# Patient Record
Sex: FEMALE | Employment: FULL TIME | URBAN - METROPOLITAN AREA
[De-identification: names, ages, dates, MRNs, and addresses within clinical notes are randomized per-mention and may not be internally consistent; named-entity substitution may affect disease eponyms.]

---

## 2017-06-01 PROBLEM — F41.9 ANXIETY: Status: ACTIVE | Noted: 2017-06-01

## 2017-12-15 ENCOUNTER — OFFICE VISIT (OUTPATIENT)
Dept: GASTROENTEROLOGY | Age: 32
End: 2017-12-15
Payer: COMMERCIAL

## 2017-12-15 VITALS
RESPIRATION RATE: 12 BRPM | SYSTOLIC BLOOD PRESSURE: 103 MMHG | HEIGHT: 68 IN | BODY MASS INDEX: 30.25 KG/M2 | HEART RATE: 108 BPM | OXYGEN SATURATION: 100 % | WEIGHT: 199.6 LBS | DIASTOLIC BLOOD PRESSURE: 72 MMHG

## 2017-12-15 DIAGNOSIS — R10.84 GENERALIZED ABDOMINAL PAIN: Primary | ICD-10-CM

## 2017-12-15 DIAGNOSIS — R10.30 LOWER ABDOMINAL PAIN: ICD-10-CM

## 2017-12-15 PROCEDURE — 99245 OFF/OP CONSLTJ NEW/EST HI 55: CPT | Performed by: INTERNAL MEDICINE

## 2017-12-15 ASSESSMENT — ENCOUNTER SYMPTOMS
CONSTIPATION: 0
NAUSEA: 0
ANAL BLEEDING: 0
ALLERGIC/IMMUNOLOGIC NEGATIVE: 1
TROUBLE SWALLOWING: 0
BLOOD IN STOOL: 0
ABDOMINAL PAIN: 1
RESPIRATORY NEGATIVE: 1
DIARRHEA: 0
RECTAL PAIN: 0
VOMITING: 0
ABDOMINAL DISTENTION: 0

## 2017-12-21 DIAGNOSIS — R10.84 GENERALIZED ABDOMINAL PAIN: Primary | ICD-10-CM

## 2017-12-21 DIAGNOSIS — R10.2 PELVIC PAIN IN FEMALE: ICD-10-CM

## 2017-12-21 LAB
ALBUMIN SERPL-MCNC: NORMAL G/DL
ALP BLD-CCNC: NORMAL U/L
ALT SERPL-CCNC: NORMAL U/L
ANION GAP SERPL CALCULATED.3IONS-SCNC: NORMAL MMOL/L
AST SERPL-CCNC: NORMAL U/L
BILIRUB SERPL-MCNC: NORMAL MG/DL (ref 0.1–1.4)
BILIRUBIN, URINE: NORMAL
BLOOD, URINE: NORMAL
BUN BLDV-MCNC: NORMAL MG/DL
CALCIUM SERPL-MCNC: NORMAL MG/DL
CHLORIDE BLD-SCNC: NORMAL MMOL/L
CLARITY: NORMAL
CO2: NORMAL MMOL/L
COLOR: NORMAL
CREAT SERPL-MCNC: NORMAL MG/DL
GFR CALCULATED: NORMAL
GLUCOSE BLD-MCNC: NORMAL MG/DL
GLUCOSE URINE: NORMAL
KETONES, URINE: NORMAL
LEUKOCYTE ESTERASE, URINE: NORMAL
LIPASE: NORMAL UNITS/L
NITRITE, URINE: NORMAL
PH UA: NORMAL (ref 4.5–8)
POTASSIUM SERPL-SCNC: NORMAL MMOL/L
PROTEIN UA: NORMAL
SODIUM BLD-SCNC: NORMAL MMOL/L
SPECIFIC GRAVITY, URINE: NORMAL
TOTAL PROTEIN: NORMAL
TSH SERPL DL<=0.05 MIU/L-ACNC: NORMAL UIU/ML
UROBILINOGEN, URINE: NORMAL

## 2017-12-26 DIAGNOSIS — R10.30 LOWER ABDOMINAL PAIN: ICD-10-CM

## 2017-12-28 ENCOUNTER — TELEPHONE (OUTPATIENT)
Dept: GASTROENTEROLOGY | Age: 32
End: 2017-12-28

## 2018-02-09 ENCOUNTER — OFFICE VISIT (OUTPATIENT)
Dept: GASTROENTEROLOGY | Age: 33
End: 2018-02-09
Payer: COMMERCIAL

## 2018-02-09 VITALS
WEIGHT: 199.2 LBS | SYSTOLIC BLOOD PRESSURE: 104 MMHG | HEART RATE: 72 BPM | HEIGHT: 69 IN | BODY MASS INDEX: 29.51 KG/M2 | DIASTOLIC BLOOD PRESSURE: 69 MMHG

## 2018-02-09 DIAGNOSIS — R10.11 RIGHT UPPER QUADRANT ABDOMINAL PAIN: ICD-10-CM

## 2018-02-09 DIAGNOSIS — K21.00 REFLUX ESOPHAGITIS: ICD-10-CM

## 2018-02-09 DIAGNOSIS — R10.84 GENERALIZED ABDOMINAL PAIN: ICD-10-CM

## 2018-02-09 DIAGNOSIS — R13.19 OTHER DYSPHAGIA: Primary | ICD-10-CM

## 2018-02-09 DIAGNOSIS — R10.31 ABDOMINAL PAIN, RIGHT LOWER QUADRANT: ICD-10-CM

## 2018-02-09 DIAGNOSIS — K52.9 ILEITIS: ICD-10-CM

## 2018-02-09 PROCEDURE — 99214 OFFICE O/P EST MOD 30 MIN: CPT | Performed by: INTERNAL MEDICINE

## 2018-02-09 ASSESSMENT — ENCOUNTER SYMPTOMS
NAUSEA: 0
ABDOMINAL DISTENTION: 0
COUGH: 0
DIARRHEA: 0
ANAL BLEEDING: 0
ABDOMINAL PAIN: 1
SINUS PAIN: 0
CHEST TIGHTNESS: 0
BACK PAIN: 0
WHEEZING: 0
TROUBLE SWALLOWING: 0
CONSTIPATION: 0
SINUS PRESSURE: 0
BLOOD IN STOOL: 0
VOMITING: 0
SHORTNESS OF BREATH: 0
RECTAL PAIN: 0
SORE THROAT: 0

## 2018-02-09 NOTE — PROGRESS NOTES
Subjective:      Patient ID: Stacey Patel is a 27 y.o. female. HPI   Dr. Maddie Richardson MD our mutual patient Stacey Patel was seen  for   1. Other dysphagia    2. Right upper quadrant abdominal pain    3. Reflux esophagitis    4. Generalized abdominal pain     . Patient seen along with her mother. Patient was seen by me few 2 months ago and at that time it was felt that patient may have IBS. She has significant family issues and has stressful lifestyle. It was felt this may be the etiology for her symptoms. She had workup done including labs/urine analysis CBC CMP lipase and TSH which appears to be within normal limits. She also had ultrasound of the pelvis to evaluate  discomfort/pain in the pelvic area. This exam reported to be unremarkable. Patient continued to have abdominal discomfort and nausea. She states that she has nocturnal symptoms not able to sleep. Her appetite has been decreased. She had cholecystectomy in the past.  Also on further discussion patient has pain in the right flank as well. It is not clear the pain is totally the right upper quadrant. The pain is not associated with micturition. No pelvic discharge. No fever or chills. Patient medications reviewed and she is on medication for anxiety depression. In fact Zoloft was increased to 100 mg a day during last visit and patient states that this is not helping her. However her swallowing is better. Still has GERD symptoms. Patient, and her mother are very concerned regarding patient's persistent symptoms. She is on omeprazole 20 mg a day. Other medications reviewed. Past Medical, Family, and Social History reviewed and does contribute to the patient presenting condition. patient\"s PMH/PSH,SH,PSYCH hx, MEDs, ALLERGIES, and ROS was all reviewed and updated ion the appropriate sections        Review of Systems   Constitutional: Negative for appetite change, fatigue and fever.    HENT: Negative for

## 2018-03-02 DIAGNOSIS — R10.31 ABDOMINAL PAIN, RIGHT LOWER QUADRANT: ICD-10-CM

## 2018-03-02 DIAGNOSIS — K52.9 ILEITIS: ICD-10-CM

## 2018-03-20 ENCOUNTER — TELEPHONE (OUTPATIENT)
Dept: GASTROENTEROLOGY | Age: 33
End: 2018-03-20

## 2018-12-04 ENCOUNTER — OFFICE VISIT (OUTPATIENT)
Dept: FAMILY MEDICINE CLINIC | Age: 33
End: 2018-12-04
Payer: COMMERCIAL

## 2018-12-04 VITALS
OXYGEN SATURATION: 98 % | WEIGHT: 195 LBS | HEIGHT: 69 IN | SYSTOLIC BLOOD PRESSURE: 105 MMHG | BODY MASS INDEX: 28.88 KG/M2 | DIASTOLIC BLOOD PRESSURE: 71 MMHG | HEART RATE: 66 BPM

## 2018-12-04 DIAGNOSIS — Z13.220 SCREENING, LIPID: ICD-10-CM

## 2018-12-04 DIAGNOSIS — Z90.49 HISTORY OF CHOLECYSTECTOMY: ICD-10-CM

## 2018-12-04 DIAGNOSIS — E55.9 VITAMIN D DEFICIENCY: ICD-10-CM

## 2018-12-04 DIAGNOSIS — R73.9 HYPERGLYCEMIA: ICD-10-CM

## 2018-12-04 DIAGNOSIS — F41.1 GAD (GENERALIZED ANXIETY DISORDER): Primary | ICD-10-CM

## 2018-12-04 DIAGNOSIS — Z13.29 SCREENING FOR THYROID DISORDER: ICD-10-CM

## 2018-12-04 DIAGNOSIS — Z11.4 ENCOUNTER FOR SCREENING FOR HIV: ICD-10-CM

## 2018-12-04 DIAGNOSIS — G43.001 MIGRAINE WITHOUT AURA AND WITH STATUS MIGRAINOSUS, NOT INTRACTABLE: ICD-10-CM

## 2018-12-04 DIAGNOSIS — E53.9 VITAMIN B DEFICIENCY: ICD-10-CM

## 2018-12-04 PROCEDURE — 99204 OFFICE O/P NEW MOD 45 MIN: CPT | Performed by: FAMILY MEDICINE

## 2018-12-04 RX ORDER — RIZATRIPTAN BENZOATE 10 MG/1
10 TABLET ORAL
COMMUNITY
End: 2018-12-04 | Stop reason: DRUGHIGH

## 2018-12-04 RX ORDER — RIZATRIPTAN BENZOATE 10 MG/1
10 TABLET ORAL
Qty: 27 TABLET | Refills: 1 | Status: SHIPPED | OUTPATIENT
Start: 2018-12-04 | End: 2019-01-07 | Stop reason: SDUPTHER

## 2018-12-04 RX ORDER — DIVALPROEX SODIUM 250 MG/1
250 TABLET, EXTENDED RELEASE ORAL DAILY
Qty: 30 TABLET | Refills: 3 | Status: SHIPPED | OUTPATIENT
Start: 2018-12-04 | End: 2019-03-06 | Stop reason: ALTCHOICE

## 2018-12-04 ASSESSMENT — PATIENT HEALTH QUESTIONNAIRE - PHQ9
2. FEELING DOWN, DEPRESSED OR HOPELESS: 0
SUM OF ALL RESPONSES TO PHQ9 QUESTIONS 1 & 2: 0
1. LITTLE INTEREST OR PLEASURE IN DOING THINGS: 0
SUM OF ALL RESPONSES TO PHQ QUESTIONS 1-9: 0
SUM OF ALL RESPONSES TO PHQ QUESTIONS 1-9: 0
1. LITTLE INTEREST OR PLEASURE IN DOING THINGS: 0
SUM OF ALL RESPONSES TO PHQ QUESTIONS 1-9: 0
SUM OF ALL RESPONSES TO PHQ QUESTIONS 1-9: 0
2. FEELING DOWN, DEPRESSED OR HOPELESS: 0
SUM OF ALL RESPONSES TO PHQ9 QUESTIONS 1 & 2: 0

## 2018-12-04 ASSESSMENT — ENCOUNTER SYMPTOMS
BLOOD IN STOOL: 0
TROUBLE SWALLOWING: 0
DIARRHEA: 0
VOICE CHANGE: 0
SHORTNESS OF BREATH: 0
RECTAL PAIN: 0
ABDOMINAL DISTENTION: 0
BACK PAIN: 0
ANAL BLEEDING: 0
EYE REDNESS: 0
EYE PAIN: 0
SINUS PRESSURE: 0
CONSTIPATION: 0
ABDOMINAL PAIN: 0
CHEST TIGHTNESS: 0
COUGH: 0
VOMITING: 0
COLOR CHANGE: 0
EYE DISCHARGE: 0
NAUSEA: 0

## 2018-12-08 LAB
ALBUMIN SERPL-MCNC: NORMAL G/DL
ALP BLD-CCNC: NORMAL U/L
ALT SERPL-CCNC: NORMAL U/L
ANION GAP SERPL CALCULATED.3IONS-SCNC: NORMAL MMOL/L
AST SERPL-CCNC: NORMAL U/L
AVERAGE GLUCOSE: 114
BASOPHILS ABSOLUTE: NORMAL /ΜL
BASOPHILS RELATIVE PERCENT: NORMAL %
BILIRUB SERPL-MCNC: NORMAL MG/DL (ref 0.1–1.4)
BUN BLDV-MCNC: NORMAL MG/DL
CALCIUM SERPL-MCNC: NORMAL MG/DL
CHLORIDE BLD-SCNC: NORMAL MMOL/L
CHOLESTEROL, TOTAL: 163 MG/DL
CHOLESTEROL/HDL RATIO: 4.1
CO2: NORMAL MMOL/L
CREAT SERPL-MCNC: 0.84 MG/DL
EOSINOPHILS ABSOLUTE: NORMAL /ΜL
EOSINOPHILS RELATIVE PERCENT: NORMAL %
GFR CALCULATED: NORMAL
GLUCOSE BLD-MCNC: NORMAL MG/DL
HBA1C MFR BLD: 5.6 %
HCT VFR BLD CALC: NORMAL % (ref 36–46)
HDLC SERPL-MCNC: 40 MG/DL (ref 35–70)
HEMOGLOBIN: NORMAL G/DL (ref 12–16)
HIV AG/AB: NORMAL
LDL CHOLESTEROL CALCULATED: 106 MG/DL (ref 0–160)
LYMPHOCYTES ABSOLUTE: NORMAL /ΜL
LYMPHOCYTES RELATIVE PERCENT: NORMAL %
MCH RBC QN AUTO: NORMAL PG
MCHC RBC AUTO-ENTMCNC: NORMAL G/DL
MCV RBC AUTO: NORMAL FL
MONOCYTES ABSOLUTE: NORMAL /ΜL
MONOCYTES RELATIVE PERCENT: NORMAL %
NEUTROPHILS ABSOLUTE: NORMAL /ΜL
NEUTROPHILS RELATIVE PERCENT: NORMAL %
PLATELET # BLD: NORMAL K/ΜL
PMV BLD AUTO: NORMAL FL
POTASSIUM SERPL-SCNC: 4.1 MMOL/L
RBC # BLD: NORMAL 10^6/ΜL
SODIUM BLD-SCNC: NORMAL MMOL/L
T3 FREE: NORMAL
T4 FREE: NORMAL
TOTAL PROTEIN: NORMAL
TRIGL SERPL-MCNC: 86 MG/DL
TSH SERPL DL<=0.05 MIU/L-ACNC: NORMAL UIU/ML
VITAMIN B-12: NORMAL
VITAMIN D 25-HYDROXY: NORMAL
VITAMIN D2, 25 HYDROXY: NORMAL
VITAMIN D3,25 HYDROXY: NORMAL
VLDLC SERPL CALC-MCNC: 17 MG/DL
WBC # BLD: NORMAL 10^3/ML

## 2018-12-13 DIAGNOSIS — Z11.4 ENCOUNTER FOR SCREENING FOR HIV: ICD-10-CM

## 2018-12-13 DIAGNOSIS — Z13.29 SCREENING FOR THYROID DISORDER: ICD-10-CM

## 2018-12-13 DIAGNOSIS — R73.9 HYPERGLYCEMIA: ICD-10-CM

## 2018-12-13 DIAGNOSIS — E55.9 VITAMIN D DEFICIENCY: ICD-10-CM

## 2018-12-13 DIAGNOSIS — Z13.220 SCREENING, LIPID: ICD-10-CM

## 2018-12-13 DIAGNOSIS — E53.9 VITAMIN B DEFICIENCY: ICD-10-CM

## 2019-01-07 ENCOUNTER — OFFICE VISIT (OUTPATIENT)
Dept: FAMILY MEDICINE CLINIC | Age: 34
End: 2019-01-07
Payer: COMMERCIAL

## 2019-01-07 VITALS
BODY MASS INDEX: 28.97 KG/M2 | HEIGHT: 69 IN | SYSTOLIC BLOOD PRESSURE: 98 MMHG | OXYGEN SATURATION: 97 % | WEIGHT: 195.6 LBS | DIASTOLIC BLOOD PRESSURE: 67 MMHG | HEART RATE: 67 BPM

## 2019-01-07 DIAGNOSIS — E53.8 B12 DEFICIENCY: ICD-10-CM

## 2019-01-07 DIAGNOSIS — F41.9 ANXIETY: ICD-10-CM

## 2019-01-07 DIAGNOSIS — G25.81 RLS (RESTLESS LEGS SYNDROME): ICD-10-CM

## 2019-01-07 DIAGNOSIS — E55.9 VITAMIN D DEFICIENCY: Primary | ICD-10-CM

## 2019-01-07 DIAGNOSIS — K21.00 REFLUX ESOPHAGITIS: ICD-10-CM

## 2019-01-07 DIAGNOSIS — G43.109 MIGRAINE WITH AURA AND WITHOUT STATUS MIGRAINOSUS, NOT INTRACTABLE: ICD-10-CM

## 2019-01-07 PROCEDURE — 99214 OFFICE O/P EST MOD 30 MIN: CPT | Performed by: FAMILY MEDICINE

## 2019-01-07 RX ORDER — RIZATRIPTAN BENZOATE 10 MG/1
10 TABLET ORAL
COMMUNITY
End: 2019-05-07 | Stop reason: SDUPTHER

## 2019-01-07 RX ORDER — MAGNESIUM 200 MG
1 TABLET ORAL DAILY
Qty: 90 TABLET | Refills: 5 | Status: SHIPPED | OUTPATIENT
Start: 2019-01-07 | End: 2020-01-29

## 2019-01-07 RX ORDER — ERGOCALCIFEROL 1.25 MG/1
50000 CAPSULE ORAL WEEKLY
Qty: 12 CAPSULE | Refills: 1 | Status: SHIPPED | OUTPATIENT
Start: 2019-01-07 | End: 2019-03-06 | Stop reason: ALTCHOICE

## 2019-01-07 RX ORDER — ROPINIROLE 0.25 MG/1
TABLET, FILM COATED ORAL
Qty: 90 TABLET | Refills: 3 | Status: SHIPPED | OUTPATIENT
Start: 2019-01-07 | End: 2020-01-14

## 2019-01-07 ASSESSMENT — ENCOUNTER SYMPTOMS
EYE DISCHARGE: 0
BACK PAIN: 0
ANAL BLEEDING: 0
VOICE CHANGE: 0
EYE PAIN: 0
DIARRHEA: 0
COUGH: 0
BLOOD IN STOOL: 0
COLOR CHANGE: 0
SHORTNESS OF BREATH: 0
SINUS PRESSURE: 0
VOMITING: 0
CHEST TIGHTNESS: 0
ABDOMINAL DISTENTION: 0
ABDOMINAL PAIN: 0
EYE REDNESS: 0
TROUBLE SWALLOWING: 0
NAUSEA: 0
CONSTIPATION: 0
RECTAL PAIN: 0

## 2019-01-07 ASSESSMENT — PATIENT HEALTH QUESTIONNAIRE - PHQ9
2. FEELING DOWN, DEPRESSED OR HOPELESS: 0
1. LITTLE INTEREST OR PLEASURE IN DOING THINGS: 0
SUM OF ALL RESPONSES TO PHQ QUESTIONS 1-9: 0
1. LITTLE INTEREST OR PLEASURE IN DOING THINGS: 0
SUM OF ALL RESPONSES TO PHQ QUESTIONS 1-9: 0
2. FEELING DOWN, DEPRESSED OR HOPELESS: 0
SUM OF ALL RESPONSES TO PHQ9 QUESTIONS 1 & 2: 0
SUM OF ALL RESPONSES TO PHQ9 QUESTIONS 1 & 2: 0

## 2019-02-05 ENCOUNTER — OFFICE VISIT (OUTPATIENT)
Dept: NEUROLOGY | Age: 34
End: 2019-02-05
Payer: COMMERCIAL

## 2019-02-05 VITALS
HEIGHT: 69 IN | BODY MASS INDEX: 28.14 KG/M2 | HEART RATE: 69 BPM | DIASTOLIC BLOOD PRESSURE: 72 MMHG | SYSTOLIC BLOOD PRESSURE: 104 MMHG | WEIGHT: 190 LBS

## 2019-02-05 DIAGNOSIS — G43.109 MIGRAINE WITH AURA AND WITHOUT STATUS MIGRAINOSUS, NOT INTRACTABLE: Primary | ICD-10-CM

## 2019-02-05 PROCEDURE — 99204 OFFICE O/P NEW MOD 45 MIN: CPT | Performed by: PSYCHIATRY & NEUROLOGY

## 2019-02-05 RX ORDER — TOPIRAMATE 25 MG/1
TABLET ORAL
Qty: 60 TABLET | Refills: 1 | Status: SHIPPED | OUTPATIENT
Start: 2019-02-05 | End: 2019-05-07 | Stop reason: ALTCHOICE

## 2019-02-19 DIAGNOSIS — G43.109 MIGRAINE WITH AURA AND WITHOUT STATUS MIGRAINOSUS, NOT INTRACTABLE: ICD-10-CM

## 2019-03-06 ENCOUNTER — OFFICE VISIT (OUTPATIENT)
Dept: NEUROLOGY | Age: 34
End: 2019-03-06
Payer: COMMERCIAL

## 2019-03-06 VITALS
WEIGHT: 191.8 LBS | HEIGHT: 69 IN | DIASTOLIC BLOOD PRESSURE: 74 MMHG | SYSTOLIC BLOOD PRESSURE: 110 MMHG | HEART RATE: 76 BPM | BODY MASS INDEX: 28.41 KG/M2

## 2019-03-06 DIAGNOSIS — G43.711 CHRONIC MIGRAINE WITHOUT AURA, WITH INTRACTABLE MIGRAINE, SO STATED, WITH STATUS MIGRAINOSUS: Primary | ICD-10-CM

## 2019-03-06 PROCEDURE — 99214 OFFICE O/P EST MOD 30 MIN: CPT | Performed by: PSYCHIATRY & NEUROLOGY

## 2019-05-07 ENCOUNTER — OFFICE VISIT (OUTPATIENT)
Dept: FAMILY MEDICINE CLINIC | Age: 34
End: 2019-05-07
Payer: COMMERCIAL

## 2019-05-07 VITALS
BODY MASS INDEX: 28.5 KG/M2 | SYSTOLIC BLOOD PRESSURE: 103 MMHG | OXYGEN SATURATION: 96 % | DIASTOLIC BLOOD PRESSURE: 70 MMHG | HEIGHT: 69 IN | HEART RATE: 80 BPM | WEIGHT: 192.4 LBS

## 2019-05-07 DIAGNOSIS — G43.809 OTHER MIGRAINE WITHOUT STATUS MIGRAINOSUS, NOT INTRACTABLE: Primary | ICD-10-CM

## 2019-05-07 DIAGNOSIS — F41.1 GAD (GENERALIZED ANXIETY DISORDER): ICD-10-CM

## 2019-05-07 DIAGNOSIS — J06.9 VIRAL URI: ICD-10-CM

## 2019-05-07 DIAGNOSIS — K21.00 REFLUX ESOPHAGITIS: ICD-10-CM

## 2019-05-07 DIAGNOSIS — F41.9 ANXIETY: ICD-10-CM

## 2019-05-07 PROCEDURE — 99213 OFFICE O/P EST LOW 20 MIN: CPT | Performed by: FAMILY MEDICINE

## 2019-05-07 RX ORDER — IBUPROFEN 600 MG/1
600 TABLET ORAL 3 TIMES DAILY PRN
Qty: 90 TABLET | Refills: 5 | Status: SHIPPED | OUTPATIENT
Start: 2019-05-07 | End: 2020-02-19 | Stop reason: SDUPTHER

## 2019-05-07 RX ORDER — RIZATRIPTAN BENZOATE 10 MG/1
10 TABLET ORAL
Qty: 9 TABLET | Refills: 0 | Status: SHIPPED | OUTPATIENT
Start: 2019-05-07 | End: 2020-02-19 | Stop reason: SDUPTHER

## 2019-05-07 ASSESSMENT — ENCOUNTER SYMPTOMS
COUGH: 0
BACK PAIN: 0
EYE PAIN: 0
SHORTNESS OF BREATH: 0
BLOOD IN STOOL: 0
NAUSEA: 0
ABDOMINAL PAIN: 0
TROUBLE SWALLOWING: 1
ABDOMINAL DISTENTION: 0
VOICE CHANGE: 0
ANAL BLEEDING: 0
RECTAL PAIN: 0
VOMITING: 0
DIARRHEA: 0
CONSTIPATION: 0
SORE THROAT: 1
EYE REDNESS: 0
COLOR CHANGE: 0
SINUS PRESSURE: 0
CHEST TIGHTNESS: 0
EYE DISCHARGE: 0

## 2019-05-07 NOTE — PROGRESS NOTES
Visit Information    Have you changed or started any medications since your last visit including any over-the-counter medicines, vitamins, or herbal medicines? no   Are you having any side effects from any of your medications? -  no  Have you stopped taking any of your medications? Is so, why? -  no    Have you seen any other physician or provider since your last visit? No  Have you had any other diagnostic tests since your last visit? Yes - Records Obtained  Have you been seen in the emergency room and/or had an admission to a hospital since we last saw you? No  Have you had your routine dental cleaning in the past 6 months? no    Have you activated your [employer] account? If not, what are your barriers?  Yes     Patient Care Team:  Kristine Del Real MD as PCP - General (Family Medicine)  Kierra Camejo MD as Consulting Physician (Gastroenterology)    Medical History Review  Past Medical, Family, and Social History reviewed and does contribute to the patient presenting condition    Health Maintenance   Topic Date Due    Varicella Vaccine (1 of 2 - 13+ 2-dose series) 03/07/1996    Cervical cancer screen  12/31/2019 (Originally 3/7/2004)    Flu vaccine (Season Ended) 12/31/2019 (Originally 9/1/2019)    DTaP/Tdap/Td vaccine (2 - Tdap) 10/12/2025    HIV screen  Completed    Pneumococcal 0-64 years Vaccine  Aged Out
Medication Sig Dispense Refill    rizatriptan (MAXALT) 10 MG tablet Take 1 tablet by mouth once as needed for Migraine May repeat in 2 hours if needed 9 tablet 0    ibuprofen (ADVIL;MOTRIN) 600 MG tablet Take 1 tablet by mouth 3 times daily as needed for Pain 90 tablet 5    sertraline (ZOLOFT) 50 MG tablet Take 1.5 tablets by mouth daily 45 tablet 5    Cyanocobalamin (B-12) 1000 MCG SUBL Place 1 tablet under the tongue daily 90 tablet 5    medroxyPROGESTERone (DEPO-PROVERA) 150 MG/ML injection Inject  into the muscle. Gets injection every 3 months      [DISCONTINUED] topiramate (TOPAMAX) 25 MG tablet 25 mg a day for 3 days then 25 mg BID 60 tablet 1    rOPINIRole (REQUIP) 0.25 MG tablet 1-2 at night as needed 90 tablet 3    [DISCONTINUED] rizatriptan (MAXALT) 10 MG tablet Take 10 mg by mouth once as needed for Migraine May repeat in 2 hours if needed      [DISCONTINUED] sertraline (ZOLOFT) 50 MG tablet Take 1.5 tabs daily to equal 75 mg a day (Patient taking differently: 75 mg Take 1.5 tabs daily to equal 75 mg a day) 135 tablet 1     No facility-administered encounter medications on file as of 5/7/2019.             Farrah Robison MD

## 2019-11-07 DIAGNOSIS — G43.001 MIGRAINE WITHOUT AURA AND WITH STATUS MIGRAINOSUS, NOT INTRACTABLE: ICD-10-CM

## 2019-11-08 RX ORDER — RIZATRIPTAN BENZOATE 10 MG/1
10 TABLET ORAL
Qty: 12 TABLET | Refills: 0 | Status: SHIPPED | OUTPATIENT
Start: 2019-11-08 | End: 2020-02-19 | Stop reason: SDUPTHER

## 2019-12-02 ENCOUNTER — OFFICE VISIT (OUTPATIENT)
Dept: FAMILY MEDICINE CLINIC | Age: 34
End: 2019-12-02
Payer: COMMERCIAL

## 2019-12-02 VITALS
WEIGHT: 196.6 LBS | DIASTOLIC BLOOD PRESSURE: 75 MMHG | SYSTOLIC BLOOD PRESSURE: 108 MMHG | OXYGEN SATURATION: 98 % | TEMPERATURE: 98.4 F | HEIGHT: 69 IN | BODY MASS INDEX: 29.12 KG/M2 | HEART RATE: 69 BPM

## 2019-12-02 DIAGNOSIS — J20.9 ACUTE BRONCHITIS, UNSPECIFIED ORGANISM: ICD-10-CM

## 2019-12-02 DIAGNOSIS — J06.9 VIRAL URI: Primary | ICD-10-CM

## 2019-12-02 PROCEDURE — 99213 OFFICE O/P EST LOW 20 MIN: CPT | Performed by: FAMILY MEDICINE

## 2019-12-02 RX ORDER — PREDNISONE 20 MG/1
TABLET ORAL
Qty: 10 TABLET | Refills: 0 | Status: SHIPPED | OUTPATIENT
Start: 2019-12-02 | End: 2020-02-19

## 2019-12-02 RX ORDER — RIZATRIPTAN BENZOATE 10 MG/1
TABLET ORAL
Refills: 0 | COMMUNITY
Start: 2019-11-08 | End: 2019-12-28

## 2019-12-02 ASSESSMENT — PATIENT HEALTH QUESTIONNAIRE - PHQ9
1. LITTLE INTEREST OR PLEASURE IN DOING THINGS: 0
2. FEELING DOWN, DEPRESSED OR HOPELESS: 0
SUM OF ALL RESPONSES TO PHQ QUESTIONS 1-9: 0
SUM OF ALL RESPONSES TO PHQ QUESTIONS 1-9: 0
SUM OF ALL RESPONSES TO PHQ9 QUESTIONS 1 & 2: 0

## 2019-12-02 ASSESSMENT — ENCOUNTER SYMPTOMS
EYE PAIN: 0
COLOR CHANGE: 0
SORE THROAT: 1
EYE DISCHARGE: 0
CHEST TIGHTNESS: 1
RECTAL PAIN: 0
RHINORRHEA: 1
SINUS PRESSURE: 0
EYE REDNESS: 0
ANAL BLEEDING: 0
VOICE CHANGE: 1
ABDOMINAL DISTENTION: 0
SHORTNESS OF BREATH: 0
BLOOD IN STOOL: 0
NAUSEA: 0
VOMITING: 0
DIARRHEA: 0
TROUBLE SWALLOWING: 0
ABDOMINAL PAIN: 0
CONSTIPATION: 0
COUGH: 1
BACK PAIN: 0

## 2019-12-28 RX ORDER — RIZATRIPTAN BENZOATE 10 MG/1
TABLET ORAL
Qty: 12 TABLET | Refills: 0 | Status: SHIPPED | OUTPATIENT
Start: 2019-12-28 | End: 2020-01-22

## 2020-01-14 RX ORDER — ROPINIROLE 0.25 MG/1
TABLET, FILM COATED ORAL
Qty: 60 TABLET | Refills: 5 | Status: SHIPPED | OUTPATIENT
Start: 2020-01-14 | End: 2021-01-29 | Stop reason: ALTCHOICE

## 2020-01-14 NOTE — TELEPHONE ENCOUNTER
[facility] is calling to request a refill on the following medication(s):  Requested Prescriptions     Pending Prescriptions Disp Refills    rOPINIRole (REQUIP) 0.25 MG tablet [Pharmacy Med Name: ROPINIROLE HCL 0.25 MG TABLET] 60 tablet 5     Sig: TAKE 1 OR 2 TABLETS BY MOUTH IN THE EVENING AS NEEDED       Last Visit Date (If Applicable):  98/1/7955    Next Visit Date:    Visit date not found    Electronically signed by Young Young MA on 1/14/20 at 12:39 PM

## 2020-01-22 RX ORDER — RIZATRIPTAN BENZOATE 10 MG/1
TABLET ORAL
Qty: 12 TABLET | Refills: 0 | Status: SHIPPED | OUTPATIENT
Start: 2020-01-22 | End: 2020-02-19 | Stop reason: SDUPTHER

## 2020-01-22 NOTE — TELEPHONE ENCOUNTER
[facility] is calling to request a refill on the following medication(s):  Requested Prescriptions     Pending Prescriptions Disp Refills    rizatriptan (MAXALT) 10 MG tablet [Pharmacy Med Name: RIZATRIPTAN 10 MG TABLET] 12 tablet 0     Sig: TAKE 1 TABLET BY MOUTH ONCE AS NEEDED FOR MIGRAINE MAY REPEAT IN 2 HOURS IF NEEDED       Last Visit Date (If Applicable):  76/1/0813    Next Visit Date:    Visit date not found    Electronically signed by Radha Eduardo MA on 1/22/20 at 1:42 PM

## 2020-01-29 RX ORDER — CHOLECALCIFEROL (VITAMIN D3) 125 MCG
CAPSULE ORAL
Qty: 30 TABLET | Refills: 17 | Status: SHIPPED | OUTPATIENT
Start: 2020-01-29

## 2020-01-29 NOTE — TELEPHONE ENCOUNTER
[facility] is calling to request a refill on the following medication(s):  Requested Prescriptions     Pending Prescriptions Disp Refills    B-12, Methylcobalamin, 1000 MCG SUBL [Pharmacy Med Name: B-12 1,000 MCG QUICK DISSOLVE] 30 tablet 17     Sig: PLACE 1 TABLET UNDER THE TONGUE DAILY       Last Visit Date (If Applicable):  30/8/5611    Next Visit Date:    Visit date not found    Electronically signed by Young Young MA on 1/29/20 at 8:30 AM

## 2020-02-19 ENCOUNTER — OFFICE VISIT (OUTPATIENT)
Dept: FAMILY MEDICINE CLINIC | Age: 35
End: 2020-02-19
Payer: COMMERCIAL

## 2020-02-19 VITALS
HEIGHT: 69 IN | SYSTOLIC BLOOD PRESSURE: 107 MMHG | DIASTOLIC BLOOD PRESSURE: 66 MMHG | HEART RATE: 63 BPM | BODY MASS INDEX: 28.17 KG/M2 | WEIGHT: 190.2 LBS | OXYGEN SATURATION: 97 %

## 2020-02-19 PROBLEM — F41.1 GAD (GENERALIZED ANXIETY DISORDER): Status: ACTIVE | Noted: 2020-02-19

## 2020-02-19 PROBLEM — F39 MOOD DISORDER (HCC): Status: ACTIVE | Noted: 2020-02-19

## 2020-02-19 PROBLEM — E53.8 B12 DEFICIENCY: Status: ACTIVE | Noted: 2020-02-19

## 2020-02-19 PROBLEM — E66.3 OVERWEIGHT (BMI 25.0-29.9): Status: ACTIVE | Noted: 2020-02-19

## 2020-02-19 PROCEDURE — 99213 OFFICE O/P EST LOW 20 MIN: CPT | Performed by: FAMILY MEDICINE

## 2020-02-19 RX ORDER — RIZATRIPTAN BENZOATE 10 MG/1
10 TABLET ORAL
Qty: 9 TABLET | Refills: 1 | Status: SHIPPED | OUTPATIENT
Start: 2020-02-19 | End: 2020-05-08

## 2020-02-19 ASSESSMENT — ENCOUNTER SYMPTOMS
EYE PAIN: 0
EYE DISCHARGE: 0
RECTAL PAIN: 0
VOICE CHANGE: 0
SINUS PRESSURE: 0
CHEST TIGHTNESS: 0
COLOR CHANGE: 0
COUGH: 0
BACK PAIN: 0
SHORTNESS OF BREATH: 0
TROUBLE SWALLOWING: 0
DIARRHEA: 0
CONSTIPATION: 0
BLOOD IN STOOL: 0
VOMITING: 0
ANAL BLEEDING: 0
NAUSEA: 0
EYE REDNESS: 0
ABDOMINAL DISTENTION: 0
ABDOMINAL PAIN: 0

## 2020-02-19 ASSESSMENT — PATIENT HEALTH QUESTIONNAIRE - PHQ9
SUM OF ALL RESPONSES TO PHQ QUESTIONS 1-9: 0
SUM OF ALL RESPONSES TO PHQ9 QUESTIONS 1 & 2: 0
1. LITTLE INTEREST OR PLEASURE IN DOING THINGS: 0
2. FEELING DOWN, DEPRESSED OR HOPELESS: 0
1. LITTLE INTEREST OR PLEASURE IN DOING THINGS: 0
SUM OF ALL RESPONSES TO PHQ9 QUESTIONS 1 & 2: 0
SUM OF ALL RESPONSES TO PHQ QUESTIONS 1-9: 0
2. FEELING DOWN, DEPRESSED OR HOPELESS: 0

## 2020-02-19 NOTE — PROGRESS NOTES
Subjective:      Patient ID: Hennie Meckel is a 37 y.o. female. HPI Here for a recheck of her mood and anxiety disorder. States sx are well controlled with zoloft 75 mg   a day. States very rare anxiety episodes that she gets only when she goes to places like [facility], usually avoids this. Sleep ok as well. Does not want Flu shot. Has PAP scheduled in April with Gyn before her next depot. Working on diet and exercise to lose weight. GERD sx controlled with occasional tums  Review of Systems   Constitutional: Negative for activity change, appetite change and fatigue. HENT: Negative for dental problem, ear pain, hearing loss, postnasal drip, sinus pressure, sneezing, tinnitus, trouble swallowing and voice change. Eyes: Negative for pain, discharge, redness and visual disturbance. Respiratory: Negative for cough, chest tightness and shortness of breath. Cardiovascular: Negative for chest pain, palpitations and leg swelling. Gastrointestinal: Negative for abdominal distention, abdominal pain, anal bleeding, blood in stool, constipation, diarrhea, nausea, rectal pain and vomiting. Endocrine: Negative for cold intolerance, heat intolerance, polydipsia, polyphagia and polyuria. Genitourinary: Negative for decreased urine volume, difficulty urinating, dyspareunia, dysuria, enuresis, flank pain, frequency, genital sores, hematuria, menstrual problem, pelvic pain, urgency, vaginal bleeding and vaginal discharge. Musculoskeletal: Negative for arthralgias, back pain, gait problem, joint swelling, myalgias, neck pain and neck stiffness. Skin: Negative for color change, pallor and rash. Allergic/Immunologic: Negative for environmental allergies, food allergies and immunocompromised state. Neurological: Negative for dizziness, tremors, seizures, syncope, facial asymmetry, speech difficulty, weakness, light-headedness, numbness and headaches. Hematological: Negative for adenopathy.  Does not bruise/bleed easily. Psychiatric/Behavioral: Negative for agitation, behavioral problems, confusion, decreased concentration, sleep disturbance and suicidal ideas. The patient is not nervous/anxious. Objective:   Physical Exam  Constitutional:       General: She is not in acute distress. HENT:      Head: Normocephalic and atraumatic. Right Ear: External ear normal.      Left Ear: External ear normal.   Eyes:      Conjunctiva/sclera: Conjunctivae normal.      Pupils: Pupils are equal, round, and reactive to light. Neck:      Musculoskeletal: Normal range of motion. Thyroid: No thyromegaly. Trachea: No tracheal deviation. Cardiovascular:      Rate and Rhythm: Normal rate and regular rhythm. Heart sounds: No murmur. No friction rub. No gallop. Pulmonary:      Effort: No respiratory distress. Breath sounds: No stridor. No wheezing or rales. Chest:      Chest wall: No tenderness. Abdominal:      General: Bowel sounds are normal. There is no distension. Palpations: Abdomen is soft. Tenderness: There is no abdominal tenderness. There is no rebound. Musculoskeletal: Normal range of motion. Lymphadenopathy:      Cervical: No cervical adenopathy. Skin:     General: Skin is warm. Coloration: Skin is not pale. Findings: No erythema or rash. Neurological:      Mental Status: She is alert and oriented to person, place, and time. Cranial Nerves: No cranial nerve deficit. Motor: No abnormal muscle tone. Deep Tendon Reflexes: Reflexes normal.         Assessment:       Diagnosis Orders   1. Mood disorder (HCC)  sertraline (ZOLOFT) 50 MG tablet   2. ELVIS (generalized anxiety disorder)  sertraline (ZOLOFT) 50 MG tablet   3. Other migraine without status migrainosus, not intractable  rizatriptan (MAXALT) 10 MG tablet   4. Anxiety     5. B12 deficiency           Plan:      No orders of the defined types were placed in this encounter.       Outpatient Encounter Medications as of 2/19/2020   Medication Sig Dispense Refill    sertraline (ZOLOFT) 50 MG tablet Take 1.5 tablets by mouth daily 135 tablet 1    rizatriptan (MAXALT) 10 MG tablet Take 1 tablet by mouth once as needed for Migraine May repeat in 2 hours if needed 9 tablet 1    rOPINIRole (REQUIP) 0.25 MG tablet TAKE 1 OR 2 TABLETS BY MOUTH IN THE EVENING AS NEEDED 60 tablet 5    medroxyPROGESTERone (DEPO-PROVERA) 150 MG/ML injection Inject  into the muscle. Gets injection every 3 months      B-12, Methylcobalamin, 1000 MCG SUBL PLACE 1 TABLET UNDER THE TONGUE DAILY (Patient not taking: Reported on 2/19/2020) 30 tablet 17    [DISCONTINUED] rizatriptan (MAXALT) 10 MG tablet TAKE 1 TABLET BY MOUTH ONCE AS NEEDED FOR MIGRAINE MAY REPEAT IN 2 HOURS IF NEEDED 12 tablet 0    [DISCONTINUED] predniSONE (DELTASONE) 20 MG tablet Take 1 tablet 2 times daily with food, for 5 days 10 tablet 0    [DISCONTINUED] rizatriptan (MAXALT) 10 MG tablet TAKE 1 TABLET BY MOUTH ONCE AS NEEDED FOR MIGRAINE MAY REPEAT IN 2 HOURS IF NEEDED 12 tablet 0    [DISCONTINUED] rizatriptan (MAXALT) 10 MG tablet Take 1 tablet by mouth once as needed for Migraine May repeat in 2 hours if needed 9 tablet 0    [DISCONTINUED] ibuprofen (ADVIL;MOTRIN) 600 MG tablet Take 1 tablet by mouth 3 times daily as needed for Pain 90 tablet 5    [DISCONTINUED] sertraline (ZOLOFT) 50 MG tablet Take 1.5 tablets by mouth daily 45 tablet 5     No facility-administered encounter medications on file as of 2/19/2020.             Massiel Dueñas MD

## 2020-05-08 RX ORDER — RIZATRIPTAN BENZOATE 10 MG/1
10 TABLET ORAL
Qty: 9 TABLET | Refills: 0 | Status: SHIPPED | OUTPATIENT
Start: 2020-05-08 | End: 2021-01-29

## 2020-05-08 NOTE — TELEPHONE ENCOUNTER
[facility] is calling to request a refill on the following medication(s):    Last Visit Date (If Applicable):  5/47/9834    Next Visit Date:    Visit date not found  Last filled 02/19/2020  Medication Request:  Requested Prescriptions     Pending Prescriptions Disp Refills    rizatriptan (MAXALT) 10 MG tablet [Pharmacy Med Name: RIZATRIPTAN 10 MG TABLET] 9 tablet 0     Sig: TAKE 1 TABLET BY MOUTH ONCE AS NEEDED FOR MIGRAINE MAY REPEAT IN 2 HOURS IF NEEDED

## 2020-06-23 RX ORDER — RIZATRIPTAN BENZOATE 10 MG/1
10 TABLET ORAL
Qty: 9 TABLET | Refills: 3 | Status: SHIPPED | OUTPATIENT
Start: 2020-06-23 | End: 2020-10-22

## 2020-07-01 ENCOUNTER — TELEMEDICINE (OUTPATIENT)
Dept: FAMILY MEDICINE CLINIC | Age: 35
End: 2020-07-01
Payer: COMMERCIAL

## 2020-07-01 PROBLEM — G44.029 CHRONIC CLUSTER HEADACHE, NOT INTRACTABLE: Status: ACTIVE | Noted: 2020-07-01

## 2020-07-01 PROBLEM — J06.9 VIRAL URI: Status: RESOLVED | Noted: 2019-05-07 | Resolved: 2020-07-01

## 2020-07-01 PROBLEM — G25.81 RLS (RESTLESS LEGS SYNDROME): Status: ACTIVE | Noted: 2020-07-01

## 2020-07-01 PROCEDURE — 99214 OFFICE O/P EST MOD 30 MIN: CPT | Performed by: FAMILY MEDICINE

## 2020-07-01 ASSESSMENT — ENCOUNTER SYMPTOMS
ANAL BLEEDING: 0
COLOR CHANGE: 0
ABDOMINAL DISTENTION: 0
CHEST TIGHTNESS: 0
BACK PAIN: 0
VOMITING: 0
COUGH: 0
VOICE CHANGE: 0
DIARRHEA: 0
RECTAL PAIN: 0
SINUS PRESSURE: 0
TROUBLE SWALLOWING: 0
ABDOMINAL PAIN: 0
EYE DISCHARGE: 0
EYE REDNESS: 0
SHORTNESS OF BREATH: 0
BLOOD IN STOOL: 0
CONSTIPATION: 0
EYE PAIN: 0
NAUSEA: 0

## 2020-07-01 ASSESSMENT — PATIENT HEALTH QUESTIONNAIRE - PHQ9
SUM OF ALL RESPONSES TO PHQ QUESTIONS 1-9: 0
SUM OF ALL RESPONSES TO PHQ9 QUESTIONS 1 & 2: 0
1. LITTLE INTEREST OR PLEASURE IN DOING THINGS: 0
SUM OF ALL RESPONSES TO PHQ QUESTIONS 1-9: 0
2. FEELING DOWN, DEPRESSED OR HOPELESS: 0

## 2020-07-01 NOTE — PROGRESS NOTES
Subjective:      Patient ID: Salvador Vieira is a 38 y.o. female. VV with patient after obtaining consent, patient and physician at respective homes. HPI  Here for follow up of chronic migraines, ELVIS, Mood disorder as well as B12 def and GERD sx. States she has been doing high intensity walking last couple of months and has lost 20 lbs . States has been having increased freq of HA after weather started changing . States maxalt helps with these and wants refills. Sleep has been good and RLS not bothersome anymore. States overall doing a lot better except for her HA that worsens always with weather changes. Drinks more than half her body weight in water. States has been doing a high protein diet. Review of Systems   Constitutional: Negative for activity change, appetite change and fatigue. HENT: Negative for dental problem, ear pain, hearing loss, postnasal drip, sinus pressure, sneezing, tinnitus, trouble swallowing and voice change. Eyes: Negative for pain, discharge, redness and visual disturbance. Respiratory: Negative for cough, chest tightness and shortness of breath. Cardiovascular: Negative for chest pain, palpitations and leg swelling. Gastrointestinal: Negative for abdominal distention, abdominal pain, anal bleeding, blood in stool, constipation, diarrhea, nausea, rectal pain and vomiting. Endocrine: Negative for cold intolerance, heat intolerance, polydipsia, polyphagia and polyuria. Genitourinary: Negative for decreased urine volume, difficulty urinating, dyspareunia, dysuria, enuresis, flank pain, frequency, genital sores, hematuria, menstrual problem, pelvic pain, urgency, vaginal bleeding and vaginal discharge. Musculoskeletal: Negative for arthralgias, back pain, gait problem, joint swelling, myalgias, neck pain and neck stiffness. Skin: Negative for color change, pallor and rash.    Allergic/Immunologic: Negative for environmental allergies, food allergies and immunocompromised state. Neurological: Positive for headaches. Negative for dizziness, tremors, seizures, syncope, facial asymmetry, speech difficulty, weakness, light-headedness and numbness. Hematological: Negative for adenopathy. Does not bruise/bleed easily. Psychiatric/Behavioral: Negative for agitation, behavioral problems, confusion, decreased concentration, sleep disturbance and suicidal ideas. The patient is not nervous/anxious. Objective:   Physical Exam  Constitutional:       General: She is not in acute distress. Appearance: Normal appearance. She is not ill-appearing, toxic-appearing or diaphoretic. Pulmonary:      Effort: Pulmonary effort is normal.   Neurological:      General: No focal deficit present. Mental Status: She is alert and oriented to person, place, and time. Mental status is at baseline. Psychiatric:         Mood and Affect: Mood normal.         Behavior: Behavior normal.         Thought Content: Thought content normal.         Judgment: Judgment normal.         Assessment:       Diagnosis Orders   1. Anxiety     2. Reflux esophagitis     3. Mood disorder ([address]     4. ELVIS (generalized anxiety disorder)     5. B12 deficiency     6. Overweight (BMI 25.0-29.9)     7. Chronic cluster headache, not intractable     8. Migraine with aura and without status migrainosus, not intractable     9. RLS (restless legs syndrome)     10. Dyslipidemia  Comprehensive Metabolic Panel    Lipid Panel   11. Vitamin D deficiency  Vitamin D 25 Hydroxy   12. Vitamin B deficiency  CBC    Folate    Vitamin B12   13. Screening for thyroid disorder  T3, Free    T4, Free    TSH without Reflex   14.  Hyperglycemia  Hemoglobin A1C         Plan:      Orders Placed This Encounter   Procedures    CBC    Comprehensive Metabolic Panel    Folate    Hemoglobin A1C    Lipid Panel    T3, Free    T4, Free    TSH without Reflex    Vitamin B12    Vitamin D 25 Hydroxy       Outpatient Encounter Medications as of 7/1/2020   Medication Sig Dispense Refill    sertraline (ZOLOFT) 50 MG tablet Take 1.5 tablets by mouth daily 135 tablet 1    B-12, Methylcobalamin, 1000 MCG SUBL PLACE 1 TABLET UNDER THE TONGUE DAILY 30 tablet 17    rOPINIRole (REQUIP) 0.25 MG tablet TAKE 1 OR 2 TABLETS BY MOUTH IN THE EVENING AS NEEDED 60 tablet 5    medroxyPROGESTERone (DEPO-PROVERA) 150 MG/ML injection Inject  into the muscle. Gets injection every 3 months      rizatriptan (MAXALT) 10 MG tablet Take 1 tablet by mouth once as needed for Migraine May repeat in 2 hours if needed 9 tablet 3    rizatriptan (MAXALT) 10 MG tablet TAKE 1 TABLET BY MOUTH ONCE AS NEEDED FOR MIGRAINE MAY REPEAT IN 2 HOURS IF NEEDED 9 tablet 0     No facility-administered encounter medications on file as of 7/1/2020.             Davin Connelly MD

## 2021-01-29 ENCOUNTER — OFFICE VISIT (OUTPATIENT)
Dept: FAMILY MEDICINE CLINIC | Age: 36
End: 2021-01-29
Payer: COMMERCIAL

## 2021-01-29 VITALS
BODY MASS INDEX: 24.97 KG/M2 | HEART RATE: 56 BPM | SYSTOLIC BLOOD PRESSURE: 102 MMHG | WEIGHT: 168.6 LBS | TEMPERATURE: 97.1 F | DIASTOLIC BLOOD PRESSURE: 68 MMHG | OXYGEN SATURATION: 99 % | HEIGHT: 69 IN

## 2021-01-29 DIAGNOSIS — R73.9 HYPERGLYCEMIA: ICD-10-CM

## 2021-01-29 DIAGNOSIS — F39 MOOD DISORDER (HCC): ICD-10-CM

## 2021-01-29 DIAGNOSIS — G43.109 MIGRAINE WITH AURA AND WITHOUT STATUS MIGRAINOSUS, NOT INTRACTABLE: ICD-10-CM

## 2021-01-29 DIAGNOSIS — Z11.59 NEED FOR HEPATITIS C SCREENING TEST: ICD-10-CM

## 2021-01-29 DIAGNOSIS — Z13.220 SCREENING, LIPID: ICD-10-CM

## 2021-01-29 DIAGNOSIS — F41.1 GAD (GENERALIZED ANXIETY DISORDER): ICD-10-CM

## 2021-01-29 DIAGNOSIS — Z13.29 SCREENING FOR THYROID DISORDER: Primary | ICD-10-CM

## 2021-01-29 DIAGNOSIS — Z78.9 USES BIRTH CONTROL: ICD-10-CM

## 2021-01-29 DIAGNOSIS — G44.029 CHRONIC CLUSTER HEADACHE, NOT INTRACTABLE: ICD-10-CM

## 2021-01-29 DIAGNOSIS — K21.00 GASTROESOPHAGEAL REFLUX DISEASE WITH ESOPHAGITIS, UNSPECIFIED WHETHER HEMORRHAGE: ICD-10-CM

## 2021-01-29 DIAGNOSIS — E53.8 B12 DEFICIENCY: ICD-10-CM

## 2021-01-29 DIAGNOSIS — E53.9 DEFICIENCY OF VITAMIN B: ICD-10-CM

## 2021-01-29 DIAGNOSIS — E55.9 VITAMIN D DEFICIENCY: ICD-10-CM

## 2021-01-29 PROBLEM — E66.3 OVERWEIGHT (BMI 25.0-29.9): Status: RESOLVED | Noted: 2020-02-19 | Resolved: 2021-01-29

## 2021-01-29 PROBLEM — G25.81 RLS (RESTLESS LEGS SYNDROME): Status: RESOLVED | Noted: 2020-07-01 | Resolved: 2021-01-29

## 2021-01-29 PROCEDURE — 99213 OFFICE O/P EST LOW 20 MIN: CPT | Performed by: FAMILY MEDICINE

## 2021-01-29 RX ORDER — RIZATRIPTAN BENZOATE 10 MG/1
10 TABLET ORAL
Qty: 9 TABLET | Refills: 5 | Status: SHIPPED | OUTPATIENT
Start: 2021-01-29 | End: 2021-10-13

## 2021-01-29 ASSESSMENT — PATIENT HEALTH QUESTIONNAIRE - PHQ9
SUM OF ALL RESPONSES TO PHQ QUESTIONS 1-9: 0
SUM OF ALL RESPONSES TO PHQ9 QUESTIONS 1 & 2: 0
SUM OF ALL RESPONSES TO PHQ QUESTIONS 1-9: 0
1. LITTLE INTEREST OR PLEASURE IN DOING THINGS: 0
2. FEELING DOWN, DEPRESSED OR HOPELESS: 0
SUM OF ALL RESPONSES TO PHQ QUESTIONS 1-9: 0
SUM OF ALL RESPONSES TO PHQ QUESTIONS 1-9: 0

## 2021-01-29 ASSESSMENT — ENCOUNTER SYMPTOMS
TROUBLE SWALLOWING: 0
ABDOMINAL DISTENTION: 0
BLOOD IN STOOL: 0
ANAL BLEEDING: 0
COUGH: 0
DIARRHEA: 0
RECTAL PAIN: 0
VOMITING: 0
EYE REDNESS: 0
BACK PAIN: 0
EYE PAIN: 0
SHORTNESS OF BREATH: 0
SINUS PRESSURE: 0
EYE DISCHARGE: 0
COLOR CHANGE: 0
ABDOMINAL PAIN: 0
CHEST TIGHTNESS: 0
NAUSEA: 0
VOICE CHANGE: 0
CONSTIPATION: 0

## 2021-01-29 NOTE — PROGRESS NOTES
Subjective:      Patient ID: Gustabo Lovell is a 38 y.o. female. HPI Here for a follow up of ELVIS, mood disorder as well as GERD , RLS as well as migraines. States RLS sx have resolved. Her GERD  s controlled with diet- if she plans on eating spicy foods she takes a tums or two as precaution. States mood, sleep, ELVIS controlled on low dose Zoloft. Has been exercising regularly and eating healthy and has lost weight since last visit. Has been   Review of Systems   Constitutional: Negative for activity change, appetite change and fatigue. HENT: Negative for dental problem, ear pain, hearing loss, postnasal drip, sinus pressure, sneezing, tinnitus, trouble swallowing and voice change. Eyes: Negative for pain, discharge, redness and visual disturbance. Respiratory: Negative for cough, chest tightness and shortness of breath. Cardiovascular: Negative for chest pain, palpitations and leg swelling. Gastrointestinal: Negative for abdominal distention, abdominal pain, anal bleeding, blood in stool, constipation, diarrhea, nausea, rectal pain and vomiting. Endocrine: Negative for cold intolerance, heat intolerance, polydipsia, polyphagia and polyuria. Genitourinary: Negative for decreased urine volume, difficulty urinating, dyspareunia, dysuria, enuresis, flank pain, frequency, genital sores, hematuria, menstrual problem, pelvic pain, urgency, vaginal bleeding and vaginal discharge. Musculoskeletal: Negative for arthralgias, back pain, gait problem, joint swelling, myalgias, neck pain and neck stiffness. Skin: Negative for color change, pallor and rash. Allergic/Immunologic: Negative for environmental allergies, food allergies and immunocompromised state. Neurological: Negative for dizziness, tremors, seizures, syncope, facial asymmetry, speech difficulty, weakness, light-headedness, numbness and headaches. Hematological: Negative for adenopathy. Does not bruise/bleed easily. Psychiatric/Behavioral: Negative for agitation, behavioral problems, confusion, decreased concentration, sleep disturbance and suicidal ideas. The patient is not nervous/anxious. Objective:   Physical Exam  Constitutional:       General: She is not in acute distress. HENT:      Head: Normocephalic and atraumatic. Right Ear: External ear normal.      Left Ear: External ear normal.   Eyes:      Conjunctiva/sclera: Conjunctivae normal.      Pupils: Pupils are equal, round, and reactive to light. Neck:      Musculoskeletal: Normal range of motion. Thyroid: No thyromegaly. Trachea: No tracheal deviation. Cardiovascular:      Rate and Rhythm: Normal rate and regular rhythm. Heart sounds: No murmur. No friction rub. No gallop. Pulmonary:      Effort: No respiratory distress. Breath sounds: No stridor. No wheezing or rales. Chest:      Chest wall: No tenderness. Abdominal:      General: Bowel sounds are normal. There is no distension. Palpations: Abdomen is soft. Tenderness: There is no abdominal tenderness. There is no rebound. Musculoskeletal: Normal range of motion. Lymphadenopathy:      Cervical: No cervical adenopathy. Skin:     General: Skin is warm. Coloration: Skin is not pale. Findings: No erythema or rash. Neurological:      General: No focal deficit present. Mental Status: She is alert and oriented to person, place, and time. Mental status is at baseline. Cranial Nerves: No cranial nerve deficit. Motor: No abnormal muscle tone. Deep Tendon Reflexes: Reflexes normal.   Psychiatric:         Mood and Affect: Mood normal.         Behavior: Behavior normal.         Thought Content: Thought content normal.         Judgment: Judgment normal.         Assessment:       Diagnosis Orders   1. Screening for thyroid disorder  T4, Free    TSH without Reflex   2. Screening, lipid  Comprehensive Metabolic Panel    Lipid Panel   3. Hyperglycemia  Hemoglobin A1C   4. Vitamin D deficiency  Vitamin D 25 Hydroxy   5. Deficiency of vitamin B  CBC    Folate    Vitamin B12   6. Need for hepatitis C screening test  Hepatitis C Antibody   7. Mood disorder (HCC)  sertraline (ZOLOFT) 50 MG tablet   8. ELVIS (generalized anxiety disorder)  sertraline (ZOLOFT) 50 MG tablet   9. B12 deficiency     10. Chronic cluster headache, not intractable     11. Migraine with aura and without status migrainosus, not intractable  rizatriptan (MAXALT) 10 MG tablet   12. Gastroesophageal reflux disease with esophagitis, unspecified whether hemorrhage     13. Uses birth control           Plan:      Orders Placed This Encounter   Procedures    CBC    Comprehensive Metabolic Panel    Folate    Hemoglobin A1C    Hepatitis C Antibody    Lipid Panel    T4, Free    TSH without Reflex    Vitamin B12    Vitamin D 25 Hydroxy       Outpatient Encounter Medications as of 1/29/2021   Medication Sig Dispense Refill    rizatriptan (MAXALT) 10 MG tablet Take 1 tablet by mouth once as needed for Migraine May repeat in 2 hours if needed 9 tablet 5    sertraline (ZOLOFT) 50 MG tablet Take 1 tablet by mouth daily 90 tablet 1    B-12, Methylcobalamin, 1000 MCG SUBL PLACE 1 TABLET UNDER THE TONGUE DAILY 30 tablet 17    medroxyPROGESTERone (DEPO-PROVERA) 150 MG/ML injection Inject  into the muscle.  Gets injection every 3 months      [DISCONTINUED] sertraline (ZOLOFT) 50 MG tablet Take 1.5 tablets by mouth daily Need seen in office before any further refill 45 tablet 0    [DISCONTINUED] rizatriptan (MAXALT) 10 MG tablet Take 1 tablet by mouth once as needed for Migraine May repeat in 2 hours if needed 9 tablet 0    [DISCONTINUED] rizatriptan (MAXALT) 10 MG tablet TAKE 1 TABLET BY MOUTH ONCE AS NEEDED FOR MIGRAINE MAY REPEAT IN 2 HOURS IF NEEDED 9 tablet 0    [DISCONTINUED] rOPINIRole (REQUIP) 0.25 MG tablet TAKE 1 OR 2 TABLETS BY MOUTH IN THE EVENING AS NEEDED 60 tablet 5     No facility-administered encounter medications on file as of 1/29/2021.             Amarilys Encinas MD

## 2021-02-01 DIAGNOSIS — F39 MOOD DISORDER (HCC): ICD-10-CM

## 2021-02-01 DIAGNOSIS — F41.1 GAD (GENERALIZED ANXIETY DISORDER): ICD-10-CM

## 2021-02-02 ENCOUNTER — TELEPHONE (OUTPATIENT)
Dept: FAMILY MEDICINE CLINIC | Age: 36
End: 2021-02-02

## 2021-02-02 DIAGNOSIS — F39 MOOD DISORDER (HCC): Primary | ICD-10-CM

## 2021-04-26 LAB
ALBUMIN SERPL-MCNC: 4.4 G/DL
ALP BLD-CCNC: 53 U/L
ALT SERPL-CCNC: 19 U/L
ANION GAP SERPL CALCULATED.3IONS-SCNC: NORMAL MMOL/L
ANTIBODY: NORMAL
AST SERPL-CCNC: 18 U/L
AVERAGE GLUCOSE: 111
BASOPHILS ABSOLUTE: NORMAL
BASOPHILS RELATIVE PERCENT: NORMAL
BILIRUB SERPL-MCNC: 1.1 MG/DL (ref 0.1–1.4)
BUN BLDV-MCNC: 25 MG/DL
CALCIUM SERPL-MCNC: 9.3 MG/DL
CHLORIDE BLD-SCNC: 108 MMOL/L
CHOLESTEROL, TOTAL: 142 MG/DL
CHOLESTEROL/HDL RATIO: 3
CO2: 26 MMOL/L
CREAT SERPL-MCNC: 0.95 MG/DL
EOSINOPHILS ABSOLUTE: NORMAL
EOSINOPHILS RELATIVE PERCENT: NORMAL
GFR CALCULATED: NORMAL
GLUCOSE BLD-MCNC: 82 MG/DL
HBA1C MFR BLD: 5.5 %
HCT VFR BLD CALC: NORMAL %
HDLC SERPL-MCNC: 48 MG/DL (ref 35–70)
HEMOGLOBIN: NORMAL
LDL CHOLESTEROL CALCULATED: 78 MG/DL (ref 0–160)
LYMPHOCYTES ABSOLUTE: NORMAL
LYMPHOCYTES RELATIVE PERCENT: NORMAL
MCH RBC QN AUTO: NORMAL PG
MCHC RBC AUTO-ENTMCNC: NORMAL G/DL
MCV RBC AUTO: NORMAL FL
MONOCYTES ABSOLUTE: NORMAL
MONOCYTES RELATIVE PERCENT: NORMAL
NEUTROPHILS ABSOLUTE: NORMAL
NEUTROPHILS RELATIVE PERCENT: NORMAL
NONHDLC SERPL-MCNC: NORMAL MG/DL
PLATELET # BLD: NORMAL 10*3/UL
PMV BLD AUTO: NORMAL FL
POTASSIUM SERPL-SCNC: 3.9 MMOL/L
RBC # BLD: NORMAL 10*6/UL
SODIUM BLD-SCNC: 143 MMOL/L
T4 FREE: NORMAL
TOTAL PROTEIN: 6.9
TRIGL SERPL-MCNC: 78 MG/DL
TSH SERPL DL<=0.05 MIU/L-ACNC: 1.56 UIU/ML
VITAMIN B-12: NORMAL
VITAMIN D 25-HYDROXY: NORMAL
VITAMIN D2, 25 HYDROXY: NORMAL
VITAMIN D3,25 HYDROXY: NORMAL
VLDLC SERPL CALC-MCNC: 16 MG/DL
WBC # BLD: NORMAL 10*3/UL

## 2021-04-29 ENCOUNTER — OFFICE VISIT (OUTPATIENT)
Dept: FAMILY MEDICINE CLINIC | Age: 36
End: 2021-04-29
Payer: COMMERCIAL

## 2021-04-29 VITALS
DIASTOLIC BLOOD PRESSURE: 62 MMHG | TEMPERATURE: 97.2 F | SYSTOLIC BLOOD PRESSURE: 89 MMHG | OXYGEN SATURATION: 99 % | HEIGHT: 69 IN | BODY MASS INDEX: 24.41 KG/M2 | WEIGHT: 164.8 LBS | HEART RATE: 62 BPM

## 2021-04-29 DIAGNOSIS — G44.029 CHRONIC CLUSTER HEADACHE, NOT INTRACTABLE: ICD-10-CM

## 2021-04-29 DIAGNOSIS — F39 MOOD DISORDER (HCC): ICD-10-CM

## 2021-04-29 DIAGNOSIS — R73.9 HYPERGLYCEMIA: ICD-10-CM

## 2021-04-29 DIAGNOSIS — F41.1 GAD (GENERALIZED ANXIETY DISORDER): ICD-10-CM

## 2021-04-29 DIAGNOSIS — U07.1 COVID-19 VIRUS INFECTION: ICD-10-CM

## 2021-04-29 DIAGNOSIS — K21.00 GASTROESOPHAGEAL REFLUX DISEASE WITH ESOPHAGITIS WITHOUT HEMORRHAGE: ICD-10-CM

## 2021-04-29 DIAGNOSIS — Z13.220 SCREENING, LIPID: ICD-10-CM

## 2021-04-29 DIAGNOSIS — E53.9 DEFICIENCY OF VITAMIN B: ICD-10-CM

## 2021-04-29 DIAGNOSIS — E53.8 B12 DEFICIENCY: ICD-10-CM

## 2021-04-29 DIAGNOSIS — Z11.59 NEED FOR HEPATITIS C SCREENING TEST: ICD-10-CM

## 2021-04-29 DIAGNOSIS — E55.9 VITAMIN D DEFICIENCY: Primary | ICD-10-CM

## 2021-04-29 DIAGNOSIS — Z13.29 SCREENING FOR THYROID DISORDER: ICD-10-CM

## 2021-04-29 DIAGNOSIS — E55.9 VITAMIN D DEFICIENCY: ICD-10-CM

## 2021-04-29 DIAGNOSIS — Z78.9 USES BIRTH CONTROL: ICD-10-CM

## 2021-04-29 PROCEDURE — 99213 OFFICE O/P EST LOW 20 MIN: CPT | Performed by: FAMILY MEDICINE

## 2021-04-29 RX ORDER — BUSPIRONE HYDROCHLORIDE 5 MG/1
5 TABLET ORAL 3 TIMES DAILY
Qty: 30 TABLET | Refills: 0 | Status: SHIPPED | OUTPATIENT
Start: 2021-04-29 | End: 2021-05-29

## 2021-04-29 ASSESSMENT — ENCOUNTER SYMPTOMS
VOMITING: 0
SORE THROAT: 1
SINUS PRESSURE: 0
CONSTIPATION: 0
VOICE CHANGE: 0
BLOOD IN STOOL: 0
COLOR CHANGE: 0
ABDOMINAL DISTENTION: 0
ANAL BLEEDING: 0
DIARRHEA: 0
RECTAL PAIN: 0
COUGH: 0
CHEST TIGHTNESS: 0
EYE DISCHARGE: 0
EYE REDNESS: 0
EYE PAIN: 0
NAUSEA: 0
SHORTNESS OF BREATH: 0
TROUBLE SWALLOWING: 0
BACK PAIN: 0
ABDOMINAL PAIN: 0

## 2021-04-29 ASSESSMENT — PATIENT HEALTH QUESTIONNAIRE - PHQ9
SUM OF ALL RESPONSES TO PHQ QUESTIONS 1-9: 0
SUM OF ALL RESPONSES TO PHQ QUESTIONS 1-9: 0

## 2021-04-29 NOTE — PROGRESS NOTES
Subjective:      Patient ID: Michael Colbert is a 43 y.o. female. HPI     States mood and anxiety stable on zoloft. But states twice had a Panic attacks both times when in   A restaurant. States otherwise she is ok. Will try short acting anxiety med and see if this will help. Sleep is ok. States GERD sx better recently, started apple cider vinegar, this seems to help, also   trying to avoid foods that trigger. States sleep is ok. Exercises regularly. Labs done and reviewed with patient. States has had an annoying sore throat and left eat pain x last 3 days. .  Covid test was neg yesterday and strep was also negative. No diff swallowing. [place] and loss of taste and sneezing - lasted for approx 2 weeks. Not yet had covid vaccine. Review of Systems   Constitutional: Negative for activity change, appetite change and fatigue. HENT: Positive for ear pain and sore throat. Negative for dental problem, hearing loss, postnasal drip, sinus pressure, sneezing, tinnitus, trouble swallowing and voice change. Eyes: Negative for pain, discharge, redness and visual disturbance. Respiratory: Negative for cough, chest tightness and shortness of breath. Cardiovascular: Negative for chest pain, palpitations and leg swelling. Gastrointestinal: Negative for abdominal distention, abdominal pain, anal bleeding, blood in stool, constipation, diarrhea, nausea, rectal pain and vomiting. Endocrine: Negative for cold intolerance, heat intolerance, polydipsia, polyphagia and polyuria. Genitourinary: Negative for decreased urine volume, difficulty urinating, dyspareunia, dysuria, enuresis, flank pain, frequency, genital sores, hematuria, menstrual problem, pelvic pain, urgency, vaginal bleeding and vaginal discharge. Musculoskeletal: Negative for arthralgias, back pain, gait problem, joint swelling, myalgias, neck pain and neck stiffness.    Skin: Negative for color change, pallor and rash.   Allergic/Immunologic: Negative for environmental allergies, food allergies and immunocompromised state. Neurological: Negative for dizziness, tremors, seizures, syncope, facial asymmetry, speech difficulty, weakness, light-headedness, numbness and headaches. Hematological: Negative for adenopathy. Does not bruise/bleed easily. Psychiatric/Behavioral: Negative for agitation, behavioral problems, confusion, decreased concentration, sleep disturbance and suicidal ideas. The patient is not nervous/anxious. Objective:   Physical Exam  Constitutional:       General: She is not in acute distress. HENT:      Head: Normocephalic and atraumatic. Right Ear: External ear normal.      Left Ear: External ear normal.   Eyes:      Conjunctiva/sclera: Conjunctivae normal.      Pupils: Pupils are equal, round, and reactive to light. Neck:      Musculoskeletal: Normal range of motion. Thyroid: No thyromegaly. Trachea: No tracheal deviation. Cardiovascular:      Rate and Rhythm: Normal rate and regular rhythm. Heart sounds: No murmur. No friction rub. No gallop. Pulmonary:      Effort: No respiratory distress. Breath sounds: No stridor. No wheezing or rales. Chest:      Chest wall: No tenderness. Abdominal:      General: Bowel sounds are normal. There is no distension. Palpations: Abdomen is soft. Tenderness: There is no abdominal tenderness. There is no rebound. Musculoskeletal: Normal range of motion. Lymphadenopathy:      Cervical: No cervical adenopathy. Skin:     General: Skin is warm. Coloration: Skin is not pale. Findings: No erythema or rash. Neurological:      Mental Status: She is alert and oriented to person, place, and time. Cranial Nerves: No cranial nerve deficit. Motor: No abnormal muscle tone. Deep Tendon Reflexes: Reflexes normal.         Assessment:       Diagnosis Orders   1. Vitamin D deficiency     2.  COVID-19 virus infection     3. Uses birth control     4. B12 deficiency     5. Chronic cluster headache, not intractable     6. ELVIS (generalized anxiety disorder)     7. Mood disorder ([facility] [address]     8. Gastroesophageal reflux disease with esophagitis without hemorrhage           Plan:      No orders of the defined types were placed in this encounter. Outpatient Encounter Medications as of 4/29/2021   Medication Sig Dispense Refill    sertraline (ZOLOFT) 50 MG tablet Take 1.5 tablets by mouth daily 135 tablet 0    B-12, Methylcobalamin, 1000 MCG SUBL PLACE 1 TABLET UNDER THE TONGUE DAILY 30 tablet 17    medroxyPROGESTERone (DEPO-PROVERA) 150 MG/ML injection Inject  into the muscle. Gets injection every 3 months      rizatriptan (MAXALT) 10 MG tablet Take 1 tablet by mouth once as needed for Migraine May repeat in 2 hours if needed 9 tablet 5     No facility-administered encounter medications on file as of 4/29/2021.             Rajani Severino MD

## 2021-05-16 DIAGNOSIS — F39 MOOD DISORDER (HCC): ICD-10-CM

## 2021-07-20 DIAGNOSIS — F39 MOOD DISORDER (HCC): ICD-10-CM

## 2021-09-14 DIAGNOSIS — F39 MOOD DISORDER (HCC): ICD-10-CM

## 2021-11-15 DIAGNOSIS — F39 MOOD DISORDER (HCC): ICD-10-CM

## 2021-11-15 NOTE — TELEPHONE ENCOUNTER
Mona Levi is calling to request a refill on the following medication(s):    Last Visit Date (If Applicable):  7/56/2177    Next Visit Date:    11/29/2021    Medication Request:  Requested Prescriptions     Pending Prescriptions Disp Refills    sertraline (ZOLOFT) 50 MG tablet 45 tablet 1

## 2021-12-02 ENCOUNTER — OFFICE VISIT (OUTPATIENT)
Dept: FAMILY MEDICINE CLINIC | Age: 36
End: 2021-12-02
Payer: COMMERCIAL

## 2021-12-02 VITALS
BODY MASS INDEX: 25.42 KG/M2 | OXYGEN SATURATION: 98 % | DIASTOLIC BLOOD PRESSURE: 67 MMHG | WEIGHT: 171.6 LBS | HEIGHT: 69 IN | HEART RATE: 84 BPM | SYSTOLIC BLOOD PRESSURE: 107 MMHG | TEMPERATURE: 97.7 F

## 2021-12-02 DIAGNOSIS — Z78.9 USES BIRTH CONTROL: ICD-10-CM

## 2021-12-02 DIAGNOSIS — F41.1 GAD (GENERALIZED ANXIETY DISORDER): ICD-10-CM

## 2021-12-02 DIAGNOSIS — G43.109 MIGRAINE WITH AURA AND WITHOUT STATUS MIGRAINOSUS, NOT INTRACTABLE: ICD-10-CM

## 2021-12-02 DIAGNOSIS — L84 CALLUS OF FOOT: ICD-10-CM

## 2021-12-02 DIAGNOSIS — F39 MOOD DISORDER (HCC): Primary | ICD-10-CM

## 2021-12-02 DIAGNOSIS — E53.8 B12 DEFICIENCY: ICD-10-CM

## 2021-12-02 PROBLEM — U07.1 COVID-19 VIRUS INFECTION: Status: RESOLVED | Noted: 2021-04-29 | Resolved: 2021-12-02

## 2021-12-02 PROBLEM — G44.029 CHRONIC CLUSTER HEADACHE, NOT INTRACTABLE: Status: RESOLVED | Noted: 2020-07-01 | Resolved: 2021-12-02

## 2021-12-02 PROCEDURE — 99213 OFFICE O/P EST LOW 20 MIN: CPT | Performed by: FAMILY MEDICINE

## 2021-12-02 RX ORDER — NITROFURANTOIN 25; 75 MG/1; MG/1
CAPSULE ORAL
COMMUNITY
Start: 2021-10-19 | End: 2021-12-02 | Stop reason: ALTCHOICE

## 2021-12-02 RX ORDER — COVID-19 MOLECULAR TEST ASSAY
KIT MISCELLANEOUS
COMMUNITY
Start: 2021-11-15

## 2021-12-02 RX ORDER — RIZATRIPTAN BENZOATE 10 MG/1
10 TABLET ORAL
Qty: 27 TABLET | Refills: 0 | Status: SHIPPED | OUTPATIENT
Start: 2021-12-02 | End: 2022-03-11

## 2021-12-02 ASSESSMENT — ENCOUNTER SYMPTOMS
COLOR CHANGE: 0
EYE REDNESS: 0
COUGH: 0
VOICE CHANGE: 0
BACK PAIN: 0
SINUS PRESSURE: 0
CHEST TIGHTNESS: 0
RECTAL PAIN: 0
ANAL BLEEDING: 0
VOMITING: 0
CONSTIPATION: 0
SHORTNESS OF BREATH: 0
TROUBLE SWALLOWING: 0
EYE DISCHARGE: 0
ABDOMINAL DISTENTION: 0
NAUSEA: 0
DIARRHEA: 0
ABDOMINAL PAIN: 0
EYE PAIN: 0
BLOOD IN STOOL: 0

## 2021-12-02 NOTE — PROGRESS NOTES
dizziness, tremors, seizures, syncope, facial asymmetry, speech difficulty, weakness, light-headedness, numbness and headaches. Hematological: Negative for adenopathy. Does not bruise/bleed easily. Psychiatric/Behavioral: Negative for agitation, behavioral problems, confusion, decreased concentration, sleep disturbance and suicidal ideas. The patient is not nervous/anxious. Objective:   Physical Exam  Constitutional:       General: She is not in acute distress. HENT:      Head: Normocephalic and atraumatic. Right Ear: External ear normal.      Left Ear: External ear normal.      Nose: Nose normal.      Mouth/Throat:      Mouth: Mucous membranes are moist.   Eyes:      Conjunctiva/sclera: Conjunctivae normal.      Pupils: Pupils are equal, round, and reactive to light. Neck:      Thyroid: No thyromegaly. Trachea: No tracheal deviation. Cardiovascular:      Rate and Rhythm: Normal rate and regular rhythm. Pulses: Normal pulses. Heart sounds: Normal heart sounds. No murmur heard. No friction rub. No gallop. Pulmonary:      Effort: Pulmonary effort is normal. No respiratory distress. Breath sounds: Normal breath sounds. No stridor. No wheezing or rales. Chest:      Chest wall: No tenderness. Abdominal:      General: Bowel sounds are normal. There is no distension. Palpations: Abdomen is soft. Tenderness: There is no abdominal tenderness. There is no rebound. Musculoskeletal:         General: Normal range of motion. Cervical back: Normal range of motion. Lymphadenopathy:      Cervical: No cervical adenopathy. Skin:     General: Skin is warm. Coloration: Skin is not pale. Findings: No erythema or rash. Neurological:      General: No focal deficit present. Mental Status: She is alert and oriented to person, place, and time. Mental status is at baseline. Cranial Nerves: No cranial nerve deficit. Motor: No abnormal muscle tone. Deep Tendon Reflexes: Reflexes normal.   Psychiatric:         Mood and Affect: Mood normal.         Behavior: Behavior normal.         Thought Content: Thought content normal.         Judgment: Judgment normal.          Vitals:    12/02/21 1606   BP: 107/67   Pulse: 84   Temp: 97.7 °F (36.5 °C)   SpO2: 98%         Assessment:      Diagnosis Orders   1. Mood disorder ([address]     2. ELVIS (generalized anxiety disorder)     3. B12 deficiency     4. Migraine with aura and without status migrainosus, not intractable     5. Uses birth control           Plan:     Orders Placed This Encounter   Procedures    [facility])       Outpatient Encounter Medications as of 12/2/2021   Medication Sig Dispense Refill    ID NOW COVID-19 KIT TEST AS DIRECTED TODAY      nitrofurantoin, macrocrystal-monohydrate, (MACROBID) 100 MG capsule       sertraline (ZOLOFT) 50 MG tablet TAKE 1 AND 1/2 TABLETS BY MOUTH EVERY DAY 45 tablet 0    B-12, Methylcobalamin, 1000 MCG SUBL PLACE 1 TABLET UNDER THE TONGUE DAILY 30 tablet 17    medroxyPROGESTERone (DEPO-PROVERA) 150 MG/ML injection Inject  into the muscle. Gets injection every 3 months      rizatriptan (MAXALT) 10 MG tablet TAKE 1 TABLET BY MOUTH ONCE AS NEEDED FOR MIGRAINE MAY REPEAT IN 2 HOURS IF NEEDED 9 tablet 0     No facility-administered encounter medications on file as of 12/2/2021.

## 2022-03-11 DIAGNOSIS — G43.109 MIGRAINE WITH AURA AND WITHOUT STATUS MIGRAINOSUS, NOT INTRACTABLE: ICD-10-CM

## 2022-03-11 RX ORDER — RIZATRIPTAN BENZOATE 10 MG/1
10 TABLET ORAL
Qty: 12 TABLET | Refills: 2 | Status: SHIPPED | OUTPATIENT
Start: 2022-03-11 | End: 2022-03-11

## 2022-06-14 DIAGNOSIS — F39 MOOD DISORDER (HCC): ICD-10-CM

## 2022-06-14 DIAGNOSIS — G43.109 MIGRAINE WITH AURA AND WITHOUT STATUS MIGRAINOSUS, NOT INTRACTABLE: ICD-10-CM

## 2022-06-14 DIAGNOSIS — F41.1 GAD (GENERALIZED ANXIETY DISORDER): ICD-10-CM

## 2022-06-15 RX ORDER — RIZATRIPTAN BENZOATE 10 MG/1
10 TABLET ORAL
Qty: 12 TABLET | Refills: 2 | OUTPATIENT
Start: 2022-06-15 | End: 2022-06-15

## 2022-06-22 ENCOUNTER — OFFICE VISIT (OUTPATIENT)
Dept: FAMILY MEDICINE CLINIC | Age: 37
End: 2022-06-22
Payer: COMMERCIAL

## 2022-06-22 VITALS
TEMPERATURE: 97.2 F | DIASTOLIC BLOOD PRESSURE: 62 MMHG | WEIGHT: 174.6 LBS | HEIGHT: 69 IN | HEART RATE: 69 BPM | SYSTOLIC BLOOD PRESSURE: 99 MMHG | OXYGEN SATURATION: 98 % | BODY MASS INDEX: 25.86 KG/M2

## 2022-06-22 DIAGNOSIS — G43.109 MIGRAINE WITH AURA AND WITHOUT STATUS MIGRAINOSUS, NOT INTRACTABLE: ICD-10-CM

## 2022-06-22 DIAGNOSIS — Z78.9 USES BIRTH CONTROL: ICD-10-CM

## 2022-06-22 DIAGNOSIS — E53.8 B12 DEFICIENCY: ICD-10-CM

## 2022-06-22 DIAGNOSIS — F39 MOOD DISORDER (HCC): Primary | ICD-10-CM

## 2022-06-22 DIAGNOSIS — E55.9 VITAMIN D DEFICIENCY: ICD-10-CM

## 2022-06-22 PROCEDURE — 99213 OFFICE O/P EST LOW 20 MIN: CPT | Performed by: FAMILY MEDICINE

## 2022-06-22 SDOH — ECONOMIC STABILITY: FOOD INSECURITY: WITHIN THE PAST 12 MONTHS, THE FOOD YOU BOUGHT JUST DIDN'T LAST AND YOU DIDN'T HAVE MONEY TO GET MORE.: NEVER TRUE

## 2022-06-22 SDOH — ECONOMIC STABILITY: FOOD INSECURITY: WITHIN THE PAST 12 MONTHS, YOU WORRIED THAT YOUR FOOD WOULD RUN OUT BEFORE YOU GOT MONEY TO BUY MORE.: NEVER TRUE

## 2022-06-22 ASSESSMENT — PATIENT HEALTH QUESTIONNAIRE - PHQ9
SUM OF ALL RESPONSES TO PHQ QUESTIONS 1-9: 0
SUM OF ALL RESPONSES TO PHQ QUESTIONS 1-9: 0
2. FEELING DOWN, DEPRESSED OR HOPELESS: 0
1. LITTLE INTEREST OR PLEASURE IN DOING THINGS: 0
SUM OF ALL RESPONSES TO PHQ QUESTIONS 1-9: 0
SUM OF ALL RESPONSES TO PHQ9 QUESTIONS 1 & 2: 0
SUM OF ALL RESPONSES TO PHQ QUESTIONS 1-9: 0

## 2022-06-22 ASSESSMENT — ENCOUNTER SYMPTOMS
TROUBLE SWALLOWING: 0
DIARRHEA: 0
CHEST TIGHTNESS: 0
BLOOD IN STOOL: 0
ABDOMINAL PAIN: 0
ABDOMINAL DISTENTION: 0
ANAL BLEEDING: 0
NAUSEA: 0
EYE DISCHARGE: 0
EYE PAIN: 0
VOMITING: 0
COLOR CHANGE: 0
BACK PAIN: 0
VOICE CHANGE: 0
CONSTIPATION: 0
EYE REDNESS: 0
SINUS PRESSURE: 0
RECTAL PAIN: 0
SHORTNESS OF BREATH: 0
COUGH: 0

## 2022-06-22 ASSESSMENT — SOCIAL DETERMINANTS OF HEALTH (SDOH): HOW HARD IS IT FOR YOU TO PAY FOR THE VERY BASICS LIKE FOOD, HOUSING, MEDICAL CARE, AND HEATING?: NOT HARD AT ALL

## 2022-06-22 NOTE — PROGRESS NOTES
Subjective:      Patient ID: Emily Toledo is a 42 y.o. female. Here for a recheck of anxiety as well as mood disorder and follow up of her chronic headaches. States needs med refills.  gets headaches when the weather changes- states starts as pressure in back of her head- sometimes moves to cheeks and to behind her eyes. Sometimes gets nauseated with these. Sometimes she can feel HA coming on - eyes cant focus, temples and shoulders start hurting. States during weather changes has a HA x 1-2 days. States if no weather changes she  Does not get one at all. States maxalt seems to stop the Headache. Usually 1 maxalt helps. And if still present takes a second one 6 hours later. States so far this year- has had a couple of HA on average a month. States had eye exam last last year or two. Wears contacts  She has seen a neurologist in the past.Dr carrillo- she did not respond to Depakote or Topamx. Aimovig and Botox was recommended but patient refused and has not seen them since 2019. States mood has been ok with zoloft- states 50 mg dose seems to help with her anxiety as well. State sometimes has to take buspar - rarely. 6-24-22  Patient called back and states she is taking 75 mg zoloft and not 50 mg daily as she told us during her visit. Review of Systems   Constitutional: Negative for activity change, appetite change and fatigue. HENT: Negative for dental problem, ear pain, hearing loss, postnasal drip, sinus pressure, sneezing, tinnitus, trouble swallowing and voice change. Eyes: Negative for pain, discharge, redness and visual disturbance. Respiratory: Negative for cough, chest tightness and shortness of breath. Cardiovascular: Negative for chest pain, palpitations and leg swelling. Gastrointestinal: Negative for abdominal distention, abdominal pain, anal bleeding, blood in stool, constipation, diarrhea, nausea, rectal pain and vomiting.    Endocrine: Negative for cold intolerance, heat Abdomen is soft. Tenderness: There is no abdominal tenderness. There is no rebound. Musculoskeletal:         General: Normal range of motion. Cervical back: Normal range of motion. Lymphadenopathy:      Cervical: No cervical adenopathy. Skin:     General: Skin is warm. Coloration: Skin is not pale. Findings: No erythema or rash. Neurological:      General: No focal deficit present. Mental Status: She is alert and oriented to person, place, and time. Mental status is at baseline. Cranial Nerves: No cranial nerve deficit. Motor: No abnormal muscle tone. Deep Tendon Reflexes: Reflexes normal.   Psychiatric:         Mood and Affect: Mood normal.         Behavior: Behavior normal.         Thought Content: Thought content normal.         Judgment: Judgment normal.          Vitals:    06/22/22 1512   BP: 99/62   Pulse: 69   Temp: 97.2 °F (36.2 °C)   SpO2: 98%         Assessment:      Diagnosis Orders   1. Mood disorder ([facility]     2. Migraine with aura and without status migrainosus, not intractable     3. Vitamin D deficiency     4. B12 deficiency     5. Uses birth control           Plan:     No orders of the defined types were placed in this encounter. Outpatient Encounter Medications as of 6/22/2022   Medication Sig Dispense Refill    sertraline (ZOLOFT) 50 MG tablet Take 1 tablet by mouth daily 90 tablet 1    ID NOW COVID-19 KIT TEST AS DIRECTED TODAY      B-12, Methylcobalamin, 1000 MCG SUBL PLACE 1 TABLET UNDER THE TONGUE DAILY 30 tablet 17    medroxyPROGESTERone (DEPO-PROVERA) 150 MG/ML injection Inject  into the muscle.  Gets injection every 3 months      rizatriptan (MAXALT) 10 MG tablet TAKE 1 TABLET BY MOUTH ONCE AS NEEDED FOR MIGRAINE MAY REPEAT IN 2 HOURS IF NEEDED 12 tablet 2    [DISCONTINUED] sertraline (ZOLOFT) 50 MG tablet Take 1.5 tablets by mouth daily 135 tablet 1     No facility-administered encounter medications on file as of 6/22/2022.     20 minutes spent with patient counseling/educating on acute/chronic medical health problems, medications,  along with treatment options. Reviewed multiple labs/imaging/medications with patient during this time. Following Diet discussion/education was done on healthy diet. In addition Exercise plan and depression screen were discussed. Recent labs/imaging were discussed and reviewed with patient.

## 2022-06-24 DIAGNOSIS — F39 MOOD DISORDER (HCC): ICD-10-CM

## 2022-06-24 DIAGNOSIS — F39 MOOD DISORDER (HCC): Primary | ICD-10-CM

## 2022-06-24 NOTE — TELEPHONE ENCOUNTER
During vidsit she never said she was taking 75 mg, she said she was taking the 50 mg dose and that's why I sent the rx the way I did.  But I sent 90 tablets which should last her 60 days if she takes 1.5 tabs a day, ask her to call in 2 months when she is ready to refill it

## 2022-06-24 NOTE — TELEPHONE ENCOUNTER
Pt not happy with only getting 30 tabs per month wants new Rx with correct directions so she gets 45 tabs per month not 30

## 2022-06-24 NOTE — TELEPHONE ENCOUNTER
I sent a new rx but she will not get it for a few days till she uses up the rx she picked up- but new rx was sent to her pharmacy and I personally spoke to the pharmacist and it seems her insurance will only cover 30 days at a time so that's why she did not get 90 day supply. so current rx will last for 20 days and she can refill after that

## 2022-06-24 NOTE — TELEPHONE ENCOUNTER
Alondra Garcia is calling to request a refill on the following medication(s):    Last Visit Date (If Applicable):  1/64/0486    Next Visit Date:    Visit date not found    Medication Request:  Requested Prescriptions     Pending Prescriptions Disp Refills    sertraline (ZOLOFT) 50 MG tablet 90 tablet 1     Sig: Take 1 tablet by mouth daily

## 2022-06-24 NOTE — TELEPHONE ENCOUNTER
Pt says she takes 1 and one half tab daily so the Rx she picked up will not last for a whole month pharm needs a new Rx with corrected instrucrions

## 2025-05-29 NOTE — PROGRESS NOTES
distention, anal bleeding, blood in stool, constipation, diarrhea, nausea, rectal pain and vomiting. Endocrine: Negative. Genitourinary: Negative. Musculoskeletal: Negative. Skin: Negative. Allergic/Immunologic: Negative. Neurological: Negative. Hematological: Negative. Psychiatric/Behavioral: Negative for sleep disturbance. The patient is nervous/anxious. Objective:   Physical Exam   Constitutional: She is oriented to person, place, and time. She appears well-developed and well-nourished. HENT:   Head: Normocephalic and atraumatic. No oral lesions   Eyes: Conjunctivae are normal. Pupils are equal, round, and reactive to light. No scleral icterus. Neck: Normal range of motion. Neck supple. No hepatojugular reflux and no JVD present. No tracheal deviation present. No thyromegaly present. Cardiovascular: Normal rate, regular rhythm, normal heart sounds and intact distal pulses. Pulmonary/Chest: Effort normal and breath sounds normal. No respiratory distress. She has no wheezes. She has no rales. Abdominal: Soft. Bowel sounds are normal. She exhibits no distension, no ascites and no mass. There is no hepatomegaly. There is no tenderness. There is no rebound. No hernia. Musculoskeletal: She exhibits no edema or tenderness. No joint swelling   Lymphadenopathy:     She has no cervical adenopathy. Neurological: She is alert and oriented to person, place, and time. No cranial nerve deficit. Skin: Skin is warm. No bruising, no ecchymosis and no rash noted. No erythema. Psychiatric: Thought content normal.   Nursing note and vitals reviewed. Assessment:      1. Generalized abdominal pain     2. Lower abdominal pain  Urinalysis    US PELVIS COMPLETE    Comprehensive Metabolic Panel    CBC Auto Differential    Lipase    TSH without Reflex           Plan:      Are examination unremarkable. Her workup which was done in the past nonspecific.     Need to evaluate pelvic pathology. I advised ultrasound of the pelvis and also advised urine analysis and other labs. .      Advised to increase Zoloft 100 mg a day. Advised to stop Ativan. Patient is explained regarding possibility of IBS also. Stressful lifestyle and anxiety may be worsening her chronic symptoms. Brochures were given regarding this. Advised to see me in the next 6-8 weeks. Sooner if symptoms get worse. Statement Selected